# Patient Record
Sex: MALE | Race: WHITE | NOT HISPANIC OR LATINO | Employment: FULL TIME | URBAN - METROPOLITAN AREA
[De-identification: names, ages, dates, MRNs, and addresses within clinical notes are randomized per-mention and may not be internally consistent; named-entity substitution may affect disease eponyms.]

---

## 2017-01-21 ENCOUNTER — OFFICE VISIT (OUTPATIENT)
Dept: INTERNAL MEDICINE | Facility: CLINIC | Age: 27
End: 2017-01-21
Payer: COMMERCIAL

## 2017-01-21 VITALS
BODY MASS INDEX: 26.92 KG/M2 | HEART RATE: 107 BPM | TEMPERATURE: 98 F | DIASTOLIC BLOOD PRESSURE: 80 MMHG | OXYGEN SATURATION: 99 % | SYSTOLIC BLOOD PRESSURE: 138 MMHG | WEIGHT: 171.5 LBS | HEIGHT: 67 IN

## 2017-01-21 DIAGNOSIS — L02.91 ABSCESS: Primary | ICD-10-CM

## 2017-01-21 PROCEDURE — 99999 PR PBB SHADOW E&M-EST. PATIENT-LVL III: CPT | Mod: PBBFAC,,, | Performed by: INTERNAL MEDICINE

## 2017-01-21 PROCEDURE — 99213 OFFICE O/P EST LOW 20 MIN: CPT | Mod: S$GLB,,, | Performed by: INTERNAL MEDICINE

## 2017-01-21 PROCEDURE — 1159F MED LIST DOCD IN RCRD: CPT | Mod: S$GLB,,, | Performed by: INTERNAL MEDICINE

## 2017-01-21 RX ORDER — SULFAMETHOXAZOLE AND TRIMETHOPRIM 800; 160 MG/1; MG/1
1 TABLET ORAL 2 TIMES DAILY
Qty: 14 TABLET | Refills: 0 | Status: SHIPPED | OUTPATIENT
Start: 2017-01-21 | End: 2017-01-28

## 2017-01-21 NOTE — MR AVS SNAPSHOT
[facility]  [address]  [facility] [address]  Phone: [phone]  Fax: [phone]                  Charles Roberto   2017 8:00 AM   Office Visit    Description:  Male : 1990   Provider:  Cora Sanchez MD   Department:  [facility]           Reason for Visit     Knots w/tenderness to touch on chest above nipple area x2-3            Diagnoses this Visit        Comments    Abscess    -  Primary            To Do List           Goals (5 Years of Data)     None       These Medications        Disp Refills Start End    sulfamethoxazole-trimethoprim 800-160mg (BACTRIM DS) 800-160 mg Tab 14 tablet 0 2017    Take 1 tablet by mouth 2 (two) times daily. - Oral    Pharmacy: [facility] [address] [facility] [email or web address]  Ph #: [phone]         [facility] On Call     [facility] On Call Nurse Care Line -  Assistance  Registered nurses in the [facility] provide clinical advisement, health education, appointment booking, and other advisory services.  Call for this free service at [phone].             Medications           Message regarding Medications     Verify the changes and/or additions to your medication regime listed below are the same as discussed with your clinician today.  If any of these changes or additions are incorrect, please notify your healthcare provider.        START taking these NEW medications        Refills    sulfamethoxazole-trimethoprim 800-160mg (BACTRIM DS) 800-160 mg Tab 0    Sig: Take 1 tablet by mouth 2 (two) times daily.    Class: Normal    Route: Oral           Verify that the below list of medications is an accurate representation of the medications you are currently taking.  If none reported, the list may be blank. If incorrect, please contact your healthcare provider. Carry this list with you in case of emergency.           Current Medications      "sulfamethoxazole-trimethoprim 800-160mg (BACTRIM DS) 800-160 mg Tab Take 1 tablet by mouth 2 (two) times daily.           Clinical Reference Information           Vital Signs - Last Recorded  Most recent update: 1/21/2017  8:17 AM by Gabrielle Hurt MA    BP Pulse Temp Ht Wt SpO2    138/80 (BP Location: Left arm, Patient Position: Sitting, BP Method: Manual) 107 98.1 °F (36.7 °C) (Oral) 5' 7" (1.702 m) 77.8 kg (171 lb 8.3 oz) 99%    BMI                26.86 kg/m2          Blood Pressure          Most Recent Value    BP  138/80      Allergies as of 1/21/2017     No Known Allergies      Immunizations Administered on Date of Encounter - 1/21/2017     None      "

## 2017-01-23 ENCOUNTER — PATIENT MESSAGE (OUTPATIENT)
Dept: INTERNAL MEDICINE | Facility: CLINIC | Age: 27
End: 2017-01-23

## 2017-01-29 ENCOUNTER — PATIENT MESSAGE (OUTPATIENT)
Dept: INTERNAL MEDICINE | Facility: CLINIC | Age: 27
End: 2017-01-29

## 2017-02-08 ENCOUNTER — TELEPHONE (OUTPATIENT)
Dept: INTERNAL MEDICINE | Facility: CLINIC | Age: 27
End: 2017-02-08

## 2017-02-08 NOTE — TELEPHONE ENCOUNTER
----- Message from Ary Rahman sent at 2/7/2017  5:50 PM CST -----  Contact: pt  Pt would like a call back regarding scheduling an appt soon then 3/23/17 due to abscessed coming back. Pt would like for nurse to call him back in regards to scheduling an appt.    Pt can be reached at [phone].

## 2017-02-09 ENCOUNTER — OFFICE VISIT (OUTPATIENT)
Dept: INTERNAL MEDICINE | Facility: CLINIC | Age: 27
End: 2017-02-09
Payer: COMMERCIAL

## 2017-02-09 VITALS
WEIGHT: 171.94 LBS | BODY MASS INDEX: 26.06 KG/M2 | SYSTOLIC BLOOD PRESSURE: 125 MMHG | DIASTOLIC BLOOD PRESSURE: 80 MMHG | HEIGHT: 68 IN

## 2017-02-09 DIAGNOSIS — L02.91 ABSCESS: ICD-10-CM

## 2017-02-09 DIAGNOSIS — I88.9 AXILLARY ADENITIS: Primary | ICD-10-CM

## 2017-02-09 PROCEDURE — 99214 OFFICE O/P EST MOD 30 MIN: CPT | Mod: S$GLB,,, | Performed by: INTERNAL MEDICINE

## 2017-02-09 PROCEDURE — 99999 PR PBB SHADOW E&M-EST. PATIENT-LVL II: CPT | Mod: PBBFAC,,, | Performed by: INTERNAL MEDICINE

## 2017-02-09 NOTE — PROGRESS NOTES
Subjective:       Patient ID: Charles Roberto is a 27 y.o. male.    Chief Complaint: Follow-up    HPI Comments:  appt- Dr Thompson patient    Apparently had LN swelling under both armpits, also associated with abscesses on chest?  Treated with antibiotics (Bactrim) 1/21.  Sx got better over the course of a week,  then got worse again after about 5 days.    No fever, chills or sweats.   No significant past medical history.  No diabetes.      Lives with roommate.   No staph that he knows of.  No pets.    Traveled last September to [place], no significant illness other than some GI issues.  This resolved- no sx since October.    Recent physical in December and labs and STD screening all OK.  No concerns about STDs at this time.    No significant PMH              Review of Systems   Constitutional: Negative for chills, fatigue and fever.   HENT: Positive for postnasal drip. Negative for congestion and ear pain.    Eyes: Negative.    Respiratory: Negative for cough, chest tightness, shortness of breath and wheezing.    Cardiovascular: Negative.    Gastrointestinal: Negative.    Genitourinary: Negative for discharge, flank pain, frequency and hematuria.   Musculoskeletal: Negative for arthralgias and back pain.   Skin: Negative for rash.   Hematological: Does not bruise/bleed easily.        See above- not sure about LN       Objective:      Physical Exam   Constitutional: He is oriented to person, place, and time. He appears well-developed and well-nourished.   HENT:   Head: Normocephalic and atraumatic.   Right Ear: External ear normal.   Left Ear: External ear normal.   Eyes: Conjunctivae and EOM are normal.   Neck: Normal range of motion. Neck supple. No thyromegaly present.   Cardiovascular: Normal rate and regular rhythm.    No murmur heard.  Pulmonary/Chest: Effort normal and breath sounds normal. No respiratory distress. He has no wheezes.   No LN in the neck or clavicle area; no axillary LN   Abdominal: Soft. He  exhibits no distension. There is no tenderness.   Musculoskeletal: He exhibits no edema or tenderness.   Lymphadenopathy:     He has no cervical adenopathy.   Neurological: He is alert and oriented to person, place, and time. No cranial nerve deficit.   Skin: Skin is warm and dry.   No lesions on chest  I do not appreciate any LN- may have tiny cyst L axillary area, freely mobile   Psychiatric: He has a normal mood and affect. His behavior is normal.       Assessment:       1. Axillary adenitis    2. Abscess        Plan:         All lesions resolved    Natural history reviewed    Recommended antibacterial soap, wash clothes in hot water    Alarm symptoms reviewed, he is having none    Offered additional screening/STD testing, this was done 6 weeks ago and was acceptable, he currently declines    Follow-up with PCP      Patient counseled for over 50% of his 25 minute appt, all questions answered,  chart reviewed and care coordinated.

## 2017-04-28 ENCOUNTER — LAB VISIT (OUTPATIENT)
Dept: LAB | Facility: HOSPITAL | Age: 27
End: 2017-04-28
Attending: INTERNAL MEDICINE
Payer: COMMERCIAL

## 2017-04-28 ENCOUNTER — PATIENT MESSAGE (OUTPATIENT)
Dept: INTERNAL MEDICINE | Facility: CLINIC | Age: 27
End: 2017-04-28

## 2017-04-28 ENCOUNTER — OFFICE VISIT (OUTPATIENT)
Dept: INTERNAL MEDICINE | Facility: CLINIC | Age: 27
End: 2017-04-28
Payer: COMMERCIAL

## 2017-04-28 VITALS
HEART RATE: 78 BPM | DIASTOLIC BLOOD PRESSURE: 81 MMHG | BODY MASS INDEX: 26.03 KG/M2 | WEIGHT: 171.75 LBS | SYSTOLIC BLOOD PRESSURE: 132 MMHG | HEIGHT: 68 IN

## 2017-04-28 DIAGNOSIS — R22.9 SKIN NODULE: ICD-10-CM

## 2017-04-28 DIAGNOSIS — K92.1 BLOOD IN STOOL: ICD-10-CM

## 2017-04-28 DIAGNOSIS — R19.5 CHANGE IN STOOL: Primary | ICD-10-CM

## 2017-04-28 DIAGNOSIS — R19.5 CHANGE IN STOOL: ICD-10-CM

## 2017-04-28 LAB
ALBUMIN SERPL BCP-MCNC: 4.4 G/DL
ALP SERPL-CCNC: 74 U/L
ALT SERPL W/O P-5'-P-CCNC: 16 U/L
ANION GAP SERPL CALC-SCNC: 7 MMOL/L
AST SERPL-CCNC: 18 U/L
BASOPHILS # BLD AUTO: 0.02 K/UL
BASOPHILS NFR BLD: 0.3 %
BILIRUB SERPL-MCNC: 1.3 MG/DL
BUN SERPL-MCNC: 12 MG/DL
CALCIUM SERPL-MCNC: 10 MG/DL
CHLORIDE SERPL-SCNC: 103 MMOL/L
CO2 SERPL-SCNC: 29 MMOL/L
CREAT SERPL-MCNC: 1.1 MG/DL
DIFFERENTIAL METHOD: ABNORMAL
EOSINOPHIL # BLD AUTO: 0.4 K/UL
EOSINOPHIL NFR BLD: 5.3 %
ERYTHROCYTE [DISTWIDTH] IN BLOOD BY AUTOMATED COUNT: 12.8 %
EST. GFR  (AFRICAN AMERICAN): >60 ML/MIN/1.73 M^2
EST. GFR  (NON AFRICAN AMERICAN): >60 ML/MIN/1.73 M^2
GLUCOSE SERPL-MCNC: 88 MG/DL
HCT VFR BLD AUTO: 47.4 %
HGB BLD-MCNC: 16.8 G/DL
LIPASE SERPL-CCNC: 17 U/L
LYMPHOCYTES # BLD AUTO: 1.9 K/UL
LYMPHOCYTES NFR BLD: 24.4 %
MCH RBC QN AUTO: 32.4 PG
MCHC RBC AUTO-ENTMCNC: 35.4 %
MCV RBC AUTO: 91 FL
MONOCYTES # BLD AUTO: 0.6 K/UL
MONOCYTES NFR BLD: 8.1 %
NEUTROPHILS # BLD AUTO: 4.8 K/UL
NEUTROPHILS NFR BLD: 61.8 %
PLATELET # BLD AUTO: 263 K/UL
PMV BLD AUTO: 11.2 FL
POTASSIUM SERPL-SCNC: 4.9 MMOL/L
PROT SERPL-MCNC: 7.7 G/DL
RBC # BLD AUTO: 5.19 M/UL
SODIUM SERPL-SCNC: 139 MMOL/L
WBC # BLD AUTO: 7.74 K/UL

## 2017-04-28 PROCEDURE — 1160F RVW MEDS BY RX/DR IN RCRD: CPT | Mod: S$GLB,,, | Performed by: INTERNAL MEDICINE

## 2017-04-28 PROCEDURE — 36415 COLL VENOUS BLD VENIPUNCTURE: CPT

## 2017-04-28 PROCEDURE — 99214 OFFICE O/P EST MOD 30 MIN: CPT | Mod: S$GLB,,, | Performed by: INTERNAL MEDICINE

## 2017-04-28 PROCEDURE — 85025 COMPLETE CBC W/AUTO DIFF WBC: CPT

## 2017-04-28 PROCEDURE — 83690 ASSAY OF LIPASE: CPT

## 2017-04-28 PROCEDURE — 80053 COMPREHEN METABOLIC PANEL: CPT

## 2017-04-28 PROCEDURE — 99999 PR PBB SHADOW E&M-EST. PATIENT-LVL III: CPT | Mod: PBBFAC,,, | Performed by: INTERNAL MEDICINE

## 2017-04-28 NOTE — MR AVS SNAPSHOT
"    [facility]  [address]  [place] [address]  Phone: [phone]  Fax: [phone]                  Charles Roberto   2017 8:40 AM   Office Visit    Description:  Male : 1990   Provider:  Samuel Thompson MD   Department:  [facility] Medicine           Reason for Visit     Follow-up           Diagnoses this Visit        Comments    Change in stool    -  Primary     Blood in stool         Skin nodule                To Do List           Goals (5 Years of Data)     None      Follow-Up and Disposition     Return in about 1 year (around 2018) for EPP annual exam.      [facility]     [facility] On Call Nurse Care Line -  Assistance  Unless otherwise directed by your provider, please contact [facility], our nurse care line that is available for  assistance.     Registered nurses in the [facility] provide: appointment scheduling, clinical advisement, health education, and other advisory services.  Call: [phone] (toll free)               Medications           Message regarding Medications     Verify the changes and/or additions to your medication regime listed below are the same as discussed with your clinician today.  If any of these changes or additions are incorrect, please notify your healthcare provider.             Verify that the below list of medications is an accurate representation of the medications you are currently taking.  If none reported, the list may be blank. If incorrect, please contact your healthcare provider. Carry this list with you in case of emergency.                Clinical Reference Information           Your Vitals Were     BP Pulse Height Weight BMI    132/81 (BP Location: Right arm, Patient Position: Sitting, BP Method: Manual) 78 5' 8" (1.727 m) 77.9 kg (171 lb 11.8 oz) 26.11 kg/m2      Blood Pressure          Most Recent Value    BP  132/81      Allergies as of 2017     No Known Allergies    "   Immunizations Administered on Date of Encounter - 4/28/2017     None      Orders Placed During Today's Visit      Normal Orders This Visit    Ambulatory Referral to Dermatology     Future Labs/Procedures Expected by Expires    CBC auto differential  4/28/2017 (Approximate) 6/27/2018    Comprehensive metabolic panel  4/28/2017 4/28/2018    Lipase  4/28/2017 7/27/2017    Occult Blood Stool, CA Screen  4/28/2017 4/28/2018    Occult Blood Stool, CA Screen  4/28/2017 4/28/2018    Occult Blood Stool, CA Screen  4/28/2017 4/28/2018    Stool Exam-Ova,Cysts,Parasites  4/28/2017 4/28/2018      Instructions    Please try an over-the-counter probiotic once daily. Culturelle is often recommended. Take for 2 weeks. If symptoms recur after stopping, restart for 4 weeks and see if this is sufficient.    If this doesn't lead to any noticeable change, try over-the-counter Pepcid or Zantac two times a day for 2 weeks.    If neither of these lead to any improvement, please notify the office for a referral to Gastroenterology.        Language Assistance Services     ATTENTION: Language assistance services are available, free of charge. Please call [phone].      ATENCIÓN: Si habla español, tiene a bosch disposición servicios gratuitos de asistencia lingüística. Llame al [phone].     LM Ý: N?u b?n nói Ti?ng Vi?t, có các d?ch v? h? tr? ngôn ng? mi?n phí dành cho b?n. G?i s? [phone].         [facility] complies with applicable Federal civil rights laws and does not discriminate on the basis of race, color, national origin, age, disability, or sex.

## 2017-04-28 NOTE — PROGRESS NOTES
"Subjective:       Patient ID: Charles Roberto is a 27 y.o. male.    Chief Complaint: Follow-up    HPI    Last visit with me 12/2015.    Yellow stool last few months. Seems to have more mucus, still formed. Sometimes black specks. No abdominal pain. No nocturnal bowel movement, no increased frequency. Never in past. Last fall went on trip to [place], had bad stomach problems but resolved. These new symptoms maybe started afterwards. No specific food triggers. No nausea. No weight change. Some meals feels more bloated than usual, not regularly. Hasn't tried any meds. No GERD. No FH of IBD.    Skin nodules not changes with antibiotics. Continues to occur. Show up close to nipples or armpits. Some redness, no pus. Some mild tenderness but after 1 week will go away. No systemic symptoms when this is going on. No fever.     Review of Systems    As per HPI    Objective:      Physical Exam   Constitutional: He is oriented to person, place, and time. No distress.    man whose Body mass index is 26.11 kg/(m^2).    Abdominal: Soft. Bowel sounds are normal. He exhibits no distension and no mass. There is no hepatosplenomegaly. There is no tenderness. There is no guarding.   Neurological: He is alert and oriented to person, place, and time.   Skin: Skin is warm and dry.   Mild localized area of erythema approx 1 cm in diameter in L upper chest near nipple without underlying nodule or skin ulceration.   Psychiatric: He has a normal mood and affect. His behavior is normal.   Nursing note and vitals reviewed.      Vitals:    04/28/17 0841   BP: 132/81   BP Location: Right arm   Patient Position: Sitting   BP Method: Manual   Pulse: 78   Weight: 77.9 kg (171 lb 11.8 oz)   Height: 5' 8" (1.727 m)     Body mass index is 26.11 kg/(m^2).    RESULTS: Reviewed labs from last 6 months    Assessment:       1. Change in stool    2. Blood in stool    3. Skin nodule        Plan:   Charles was seen today for follow-up.    Diagnoses and all " "orders for this visit:    Change in stool:  Reports some mucus, assess as below. Given absence of pain and weight loss seems less likely to be IBD, possible IBS. Also try over-the-counter meds to see if there is improvement; if not will refer to Gastroenterology:  "Please try an over-the-counter probiotic once daily. Culturelle is often recommended. Take for 2 weeks. If symptoms recur after stopping, restart for 4 weeks and see if this is sufficient.  If this doesn't lead to any noticeable change, try over-the-counter Pepcid or Zantac two times a day for 2 weeks.  If neither of these lead to any improvement, please notify the office for a referral to Gastroenterology."  -     Stool Exam-Ova,Cysts,Parasites; Future  -     Comprehensive metabolic panel; Future  -     CBC auto differential; Future  -     Lipase; Future    Blood in stool:  Only 1-2 times, will check for occult blood.  -     Cancel: Occult blood x 1, stool; Future  -     Occult Blood Stool, CA Screen; Future  -     Occult Blood Stool, CA Screen; Future  -     Occult Blood Stool, CA Screen; Future    Skin nodule:  In armpits and around nipples, unclear etiology, eval with Dermatology.  -     Ambulatory Referral to Dermatology    Return in about 1 year (around 4/28/2018) for EPP annual exam. Lab today.  Samuel Thompson MD  Internal Medicine    Portions of this note were completed using [employer] dictation software. Please excuse typographical or syntax errors.   "

## 2017-04-28 NOTE — PATIENT INSTRUCTIONS
Please try an over-the-counter probiotic once daily. Culturelle is often recommended. Take for 2 weeks. If symptoms recur after stopping, restart for 4 weeks and see if this is sufficient.    If this doesn't lead to any noticeable change, try over-the-counter Pepcid or Zantac two times a day for 2 weeks.    If neither of these lead to any improvement, please notify the office for a referral to Gastroenterology.

## 2017-05-08 ENCOUNTER — LAB VISIT (OUTPATIENT)
Dept: LAB | Facility: HOSPITAL | Age: 27
End: 2017-05-08
Attending: INTERNAL MEDICINE
Payer: COMMERCIAL

## 2017-05-08 DIAGNOSIS — R19.5 CHANGE IN STOOL: ICD-10-CM

## 2017-05-08 DIAGNOSIS — K92.1 BLOOD IN STOOL: ICD-10-CM

## 2017-05-08 PROCEDURE — 82270 OCCULT BLOOD FECES: CPT

## 2017-05-09 LAB
OB PNL STL: NEGATIVE

## 2017-05-31 ENCOUNTER — PATIENT MESSAGE (OUTPATIENT)
Dept: INTERNAL MEDICINE | Facility: CLINIC | Age: 27
End: 2017-05-31

## 2017-05-31 NOTE — TELEPHONE ENCOUNTER
"Please call lab. Pt had delivered stool sample for testing a few weeks ago; test for ova and parasites still reports "In Process". Please see what is going on with this test.  "

## 2017-06-02 ENCOUNTER — TELEPHONE (OUTPATIENT)
Dept: DERMATOLOGY | Facility: CLINIC | Age: 27
End: 2017-06-02

## 2017-06-02 NOTE — TELEPHONE ENCOUNTER
Spoke with pt, informed him that we can cancel his appointment on 6/6 and when the spot comes back up, he can send us a message and we will try to get him in within the next couple of days.     ----- Message from Clifton Linton sent at 6/2/2017  4:11 PM CDT -----  Contact: Patient  Pt has appt to be seen for an abscess that comes and goes but is requesting to speak to staff as he does not have any currently. Pt does not want to waste provider's time by having nothing to show but would like to speak with her to have care in place once another one comes. Please call pt at  [phone]

## 2017-06-02 NOTE — TELEPHONE ENCOUNTER
Spoke to [place] today he looked it up he said it never got done  Couldn't explain anything else to me asked him if the kit was there he said no and he doesn't know what happened but that it may need to be recollected

## 2017-06-12 ENCOUNTER — PATIENT MESSAGE (OUTPATIENT)
Dept: INTERNAL MEDICINE | Facility: CLINIC | Age: 27
End: 2017-06-12

## 2017-06-12 DIAGNOSIS — R19.7 DIARRHEA, UNSPECIFIED TYPE: Primary | ICD-10-CM

## 2017-06-13 NOTE — TELEPHONE ENCOUNTER
I have placed a new order for a stool sample. Please call lab to see if there are any special requirements for  or drop off. Once everything is in place, please call the patient to relay instructions.

## 2017-06-19 ENCOUNTER — LAB VISIT (OUTPATIENT)
Dept: LAB | Facility: HOSPITAL | Age: 27
End: 2017-06-19
Attending: INTERNAL MEDICINE
Payer: COMMERCIAL

## 2017-06-19 DIAGNOSIS — R19.7 DIARRHEA, UNSPECIFIED TYPE: ICD-10-CM

## 2017-06-19 PROCEDURE — 87209 SMEAR COMPLEX STAIN: CPT

## 2017-06-20 ENCOUNTER — PATIENT MESSAGE (OUTPATIENT)
Dept: INTERNAL MEDICINE | Facility: CLINIC | Age: 27
End: 2017-06-20

## 2017-06-20 DIAGNOSIS — R19.7 DIARRHEA, UNSPECIFIED TYPE: Primary | ICD-10-CM

## 2017-06-20 LAB — O+P STL TRI STN: NORMAL

## 2017-06-26 ENCOUNTER — PATIENT MESSAGE (OUTPATIENT)
Dept: DERMATOLOGY | Facility: CLINIC | Age: 27
End: 2017-06-26

## 2017-08-24 ENCOUNTER — LAB VISIT (OUTPATIENT)
Dept: LAB | Facility: HOSPITAL | Age: 27
End: 2017-08-24
Payer: COMMERCIAL

## 2017-08-24 ENCOUNTER — OFFICE VISIT (OUTPATIENT)
Dept: GASTROENTEROLOGY | Facility: CLINIC | Age: 27
End: 2017-08-24
Payer: COMMERCIAL

## 2017-08-24 VITALS
HEART RATE: 99 BPM | HEIGHT: 68 IN | SYSTOLIC BLOOD PRESSURE: 130 MMHG | BODY MASS INDEX: 24.46 KG/M2 | WEIGHT: 161.38 LBS | DIASTOLIC BLOOD PRESSURE: 79 MMHG

## 2017-08-24 DIAGNOSIS — R19.5 LOOSE STOOLS: Primary | ICD-10-CM

## 2017-08-24 DIAGNOSIS — R19.5 LOOSE STOOLS: ICD-10-CM

## 2017-08-24 DIAGNOSIS — R15.1 FECAL SMEARING: ICD-10-CM

## 2017-08-24 DIAGNOSIS — R79.89 ELEVATED LFTS: ICD-10-CM

## 2017-08-24 LAB
ALBUMIN SERPL BCP-MCNC: 4.7 G/DL
ALP SERPL-CCNC: 89 U/L
ALT SERPL W/O P-5'-P-CCNC: 20 U/L
AST SERPL-CCNC: 18 U/L
BILIRUB DIRECT SERPL-MCNC: 0.4 MG/DL
BILIRUB SERPL-MCNC: 1.3 MG/DL
PROT SERPL-MCNC: 8.4 G/DL

## 2017-08-24 PROCEDURE — 3008F BODY MASS INDEX DOCD: CPT | Mod: S$GLB,,, | Performed by: PHYSICIAN ASSISTANT

## 2017-08-24 PROCEDURE — 99999 PR PBB SHADOW E&M-EST. PATIENT-LVL III: CPT | Mod: PBBFAC,,, | Performed by: PHYSICIAN ASSISTANT

## 2017-08-24 PROCEDURE — 36415 COLL VENOUS BLD VENIPUNCTURE: CPT

## 2017-08-24 PROCEDURE — 99203 OFFICE O/P NEW LOW 30 MIN: CPT | Mod: S$GLB,,, | Performed by: PHYSICIAN ASSISTANT

## 2017-08-24 PROCEDURE — 80076 HEPATIC FUNCTION PANEL: CPT

## 2017-08-24 NOTE — PATIENT INSTRUCTIONS
Restart the culturelle- take for atleast 6 weeks    Take miralax 17g (1 capful) at night around 6pm in 10oz of water. Take for 1 weeks

## 2017-08-24 NOTE — PROGRESS NOTES
[facility] Consultation Note    Reason for Consult:  The primary encounter diagnosis was Loose stools. Diagnoses of Fecal smearing and Elevated LFTs were also pertinent to this visit.    PCP: Samuel Thompson   [address] / [place] [address]  REF MD: Samuel Thompson MD  [address] [facility] [address] [facility] [address] [place] [address]    HPI:  This is a 27 y.o. male here for evaluation of diarrhea.    Bowel movements a day - 1-2 per day   Consistency - loose  Duration - 8 months  Antibiotics/Travel/Well Water - no  Blood/Mucus/Pus/Oily - stool appear yellow, + mucus,   Nocturnal stools - no   Weight loss - no   Fasting - no change with out without food  Mild periumbilical pain - ocaasional if he eats a large meal  Food poisoning oct 2016 - diarrhea and dark stools, did not take antibiotics, and eventually resolved. He has noticed the above change in bowel habits ever since    Tried taking a probiotic  (Culturelle) for 1 week no change  Also occasional fecal leakage     No Fhx GI cancers or     Intermittent abcesses feb 2017, non recent skin lesions. No joint pains    He was seen by his PCP for this issue and o/p stool studies were normal.     ROS:  Constitutional: No fevers, chills, No weight loss  ENT: No allergies  CV: No chest pain  Pulm: No cough, No shortness of breath  Ophtho: No vision changes  GI: see HPI  Derm: No rash  Heme: No lymphadenopathy, No bruising  MSK: No arthritis  : No dysuria, No hematuria  Endo: No hot or cold intolerance  Neuro: No syncope, No seizure  Psych: No anxiety, No depression    Medical History:  has no past medical history on file.    Surgical History:  has no past surgical history on file.    Family History: family history includes Diabetes in his paternal aunt; Gout in his father; No Known Problems in his mother..     Social History:  reports that he has never smoked. He has never used smokeless tobacco. He reports that he drinks alcohol. He reports that he does not use  "drugs.    Review of patient's allergies indicates:  No Known Allergies    No current outpatient prescriptions on file.     No current facility-administered medications for this visit.          Objective Findings:    Vital Signs:  /79   Pulse 99   Ht 5' 8" (1.727 m)   Wt 73.2 kg (161 lb 6 oz)   BMI 24.54 kg/m²   Body mass index is 24.54 kg/m².    Physical Exam:  General Appearance: Well appearing in no acute distress  Head:   Normocephalic, without obvious abnormality  Eyes:    No scleral icterus  ENT: Neck supple, Lips, mucosa, and tongue normal; teeth and gums normal  Lungs: CTA bilaterally in anterior and posterior fields, no wheezes, no crackles.  Heart:  Regular rate and rhythm, S1, S2 normal, no murmurs heard  Abdomen: Soft, non tender, non distended with positive bowel sounds in all four quadrants.  Extremities: no edema  Skin: No rash  Neurologic: AAO x 3     Labs:  Lab Results   Component Value Date    WBC 7.74 04/28/2017    HGB 16.8 04/28/2017    HCT 47.4 04/28/2017     04/28/2017    CHOL 182 12/17/2016    TRIG 106 12/17/2016    HDL 46 12/17/2016    ALT 20 08/24/2017    AST 18 08/24/2017     04/28/2017    K 4.9 04/28/2017     04/28/2017    CREATININE 1.1 04/28/2017    BUN 12 04/28/2017    CO2 29 04/28/2017         Endoscopy:      Assessment:  1. Loose stools    2. Fecal smearing    3. Elevated LFTs       28yo M with loose yellow mucus burdened stools x 8 months after an episode of food poisoning. Occasional fecal smearing    Recommendations:  1. Stool studies to rule out infection and inflammation and malabsorption    2. Labs to rule out inflammation     3. Re-start culturelle    Take miralax 17g (1 capful) at night around 6pm in 10oz of water. Take for 1 week to see if fecal smearing improves    No Follow-up on file.      Order summary:  Orders Placed This Encounter    Stool culture    WBC, Stool    C-reactive protein    Giardia / Cryptosporidum, EIA    Giardia / " Cryptosporidum, EIA    Giardia / Cryptosporidum, EIA    Fecal fat, qualitative    Hepatic function panel         Thank you so much for allowing me to participate in the care of Charles Ambriz MD

## 2017-08-24 NOTE — LETTER
August 24, 2017      Samuel Thompson MD  [address]  [place] [address]           [facility] - Gastroenterology  [address]  [place] [address]  Phone: [phone]  Fax: [phone]          Patient: Charles Roberto   MR Number: [id number]   YOB: 1990   Date of Visit: 8/24/2017       Dear Dr. Samuel Thompson:    Thank you for referring Charles Roberto to me for evaluation. Attached you will find relevant portions of my assessment and plan of care.    If you have questions, please do not hesitate to call me. I look forward to following Charles Roberto along with you.    Sincerely,    Adiel Ambriz PA-C    Enclosure  CC:  No Recipients    If you would like to receive this communication electronically, please contact [email or web address] or [phone] to request more information on [employer] access.    For providers and/or their staff who would like to refer a patient to [facility], please contact us through our one-stop-shop provider referral line, [facility] , at [phone].    If you feel you have received this communication in error or would no longer like to receive these types of communications, please e-mail [email or web address]

## 2017-08-27 ENCOUNTER — PATIENT MESSAGE (OUTPATIENT)
Dept: GASTROENTEROLOGY | Facility: CLINIC | Age: 27
End: 2017-08-27

## 2017-08-27 DIAGNOSIS — R17 ELEVATED BILIRUBIN: Primary | ICD-10-CM

## 2017-08-28 ENCOUNTER — TELEPHONE (OUTPATIENT)
Dept: GASTROENTEROLOGY | Facility: CLINIC | Age: 27
End: 2017-08-28

## 2017-08-28 NOTE — TELEPHONE ENCOUNTER
Attempted to contact patient in regards to scheduling abd US with no success.     Left Message for patient to call the clinic.

## 2017-08-30 ENCOUNTER — PATIENT MESSAGE (OUTPATIENT)
Dept: GASTROENTEROLOGY | Facility: CLINIC | Age: 27
End: 2017-08-30

## 2017-08-30 ENCOUNTER — LAB VISIT (OUTPATIENT)
Dept: LAB | Facility: HOSPITAL | Age: 27
End: 2017-08-30
Attending: PHYSICIAN ASSISTANT
Payer: COMMERCIAL

## 2017-08-30 DIAGNOSIS — R19.5 LOOSE STOOLS: ICD-10-CM

## 2017-08-30 PROCEDURE — 87046 STOOL CULTR AEROBIC BACT EA: CPT

## 2017-08-30 PROCEDURE — 87427 SHIGA-LIKE TOXIN AG IA: CPT | Mod: 59

## 2017-08-30 PROCEDURE — 89125 SPECIMEN FAT STAIN: CPT

## 2017-08-30 PROCEDURE — 87329 GIARDIA AG IA: CPT

## 2017-08-30 PROCEDURE — 87045 FECES CULTURE AEROBIC BACT: CPT

## 2017-08-30 PROCEDURE — 89055 LEUKOCYTE ASSESSMENT FECAL: CPT

## 2017-08-31 ENCOUNTER — HOSPITAL ENCOUNTER (OUTPATIENT)
Dept: RADIOLOGY | Facility: HOSPITAL | Age: 27
Discharge: HOME | End: 2017-08-31
Attending: PHYSICIAN ASSISTANT
Payer: COMMERCIAL

## 2017-08-31 DIAGNOSIS — R17 ELEVATED BILIRUBIN: ICD-10-CM

## 2017-08-31 LAB
E COLI SXT1 STL QL IA: NEGATIVE
E COLI SXT2 STL QL IA: NEGATIVE
FAT STL SUDAN IV STN: NORMAL
WBC #/AREA STL HPF: NORMAL /[HPF]

## 2017-08-31 PROCEDURE — 76700 US EXAM ABDOM COMPLETE: CPT | Mod: TC

## 2017-08-31 PROCEDURE — 76700 US EXAM ABDOM COMPLETE: CPT | Mod: 26,,, | Performed by: RADIOLOGY

## 2017-09-01 LAB
CRYPTOSP AG STL QL IA: NEGATIVE
G LAMBLIA AG STL QL IA: NEGATIVE

## 2017-09-04 ENCOUNTER — PATIENT MESSAGE (OUTPATIENT)
Dept: GASTROENTEROLOGY | Facility: CLINIC | Age: 27
End: 2017-09-04

## 2017-09-05 ENCOUNTER — PATIENT MESSAGE (OUTPATIENT)
Dept: GASTROENTEROLOGY | Facility: CLINIC | Age: 27
End: 2017-09-05

## 2017-09-05 LAB — BACTERIA STL CULT: NORMAL

## 2018-12-27 ENCOUNTER — OFFICE VISIT (OUTPATIENT)
Dept: INTERNAL MEDICINE | Facility: CLINIC | Age: 28
End: 2018-12-27
Payer: COMMERCIAL

## 2018-12-27 ENCOUNTER — LAB VISIT (OUTPATIENT)
Dept: LAB | Facility: HOSPITAL | Age: 28
End: 2018-12-27
Attending: PHYSICIAN ASSISTANT
Payer: COMMERCIAL

## 2018-12-27 VITALS
OXYGEN SATURATION: 98 % | HEART RATE: 87 BPM | DIASTOLIC BLOOD PRESSURE: 74 MMHG | WEIGHT: 166.25 LBS | HEIGHT: 68 IN | BODY MASS INDEX: 25.2 KG/M2 | SYSTOLIC BLOOD PRESSURE: 126 MMHG

## 2018-12-27 DIAGNOSIS — Z00.00 ANNUAL PHYSICAL EXAM: Primary | ICD-10-CM

## 2018-12-27 DIAGNOSIS — Z00.00 ANNUAL PHYSICAL EXAM: ICD-10-CM

## 2018-12-27 LAB
ALBUMIN SERPL BCP-MCNC: 4.3 G/DL
ALP SERPL-CCNC: 61 U/L
ALT SERPL W/O P-5'-P-CCNC: 84 U/L
ANION GAP SERPL CALC-SCNC: 10 MMOL/L
AST SERPL-CCNC: 133 U/L
BASOPHILS # BLD AUTO: 0.03 K/UL
BASOPHILS NFR BLD: 0.3 %
BILIRUB SERPL-MCNC: 1.1 MG/DL
BUN SERPL-MCNC: 12 MG/DL
CALCIUM SERPL-MCNC: 9.6 MG/DL
CHLORIDE SERPL-SCNC: 103 MMOL/L
CO2 SERPL-SCNC: 28 MMOL/L
CREAT SERPL-MCNC: 1.1 MG/DL
DIFFERENTIAL METHOD: ABNORMAL
EOSINOPHIL # BLD AUTO: 0.6 K/UL
EOSINOPHIL NFR BLD: 6.5 %
ERYTHROCYTE [DISTWIDTH] IN BLOOD BY AUTOMATED COUNT: 12.7 %
EST. GFR  (AFRICAN AMERICAN): >60 ML/MIN/1.73 M^2
EST. GFR  (NON AFRICAN AMERICAN): >60 ML/MIN/1.73 M^2
GLUCOSE SERPL-MCNC: 84 MG/DL
HCT VFR BLD AUTO: 45.9 %
HGB BLD-MCNC: 15.8 G/DL
LYMPHOCYTES # BLD AUTO: 2.4 K/UL
LYMPHOCYTES NFR BLD: 27.7 %
MCH RBC QN AUTO: 31.8 PG
MCHC RBC AUTO-ENTMCNC: 34.4 G/DL
MCV RBC AUTO: 92 FL
MONOCYTES # BLD AUTO: 0.7 K/UL
MONOCYTES NFR BLD: 8.4 %
NEUTROPHILS # BLD AUTO: 4.9 K/UL
NEUTROPHILS NFR BLD: 56.9 %
PLATELET # BLD AUTO: 272 K/UL
PMV BLD AUTO: 10.6 FL
POTASSIUM SERPL-SCNC: 4.3 MMOL/L
PROT SERPL-MCNC: 7.2 G/DL
RBC # BLD AUTO: 4.97 M/UL
SODIUM SERPL-SCNC: 141 MMOL/L
WBC # BLD AUTO: 8.65 K/UL

## 2018-12-27 PROCEDURE — 80053 COMPREHEN METABOLIC PANEL: CPT

## 2018-12-27 PROCEDURE — 99999 PR PBB SHADOW E&M-EST. PATIENT-LVL III: CPT | Mod: PBBFAC,,, | Performed by: PHYSICIAN ASSISTANT

## 2018-12-27 PROCEDURE — 36415 COLL VENOUS BLD VENIPUNCTURE: CPT

## 2018-12-27 PROCEDURE — 99395 PREV VISIT EST AGE 18-39: CPT | Mod: S$GLB,,, | Performed by: PHYSICIAN ASSISTANT

## 2018-12-27 PROCEDURE — 85025 COMPLETE CBC W/AUTO DIFF WBC: CPT

## 2018-12-27 NOTE — PROGRESS NOTES
"Subjective:       Patient ID: Charles Roberto is a 28 y.o. male with no significant PMH.     Chief Complaint: Annual Exam    HPI     Established pt of Samuel Thompson MD    Presents today for annual exam. No acute complaints.     Last seen by Dr. Madera in 4/2017 with c/o change is stool since then he reports he had endoscopy and colonoscopy in [place] this year that was normal.     Review of patient's allergies indicates:  No Known Allergies    Social History     Tobacco Use    Smoking status: Never Smoker    Smokeless tobacco: Never Used   Substance Use Topics    Alcohol use: Yes     Comment: Mostly on weekends    Drug use: No     Patient Active Problem List   Diagnosis    Loose stools         History reviewed. No pertinent past medical history.      Review of Systems   Constitutional: Negative for chills, fever and unexpected weight change.   Eyes: Negative for visual disturbance.   Respiratory: Negative for cough, shortness of breath and wheezing.    Cardiovascular: Negative for chest pain and leg swelling.   Gastrointestinal: Negative for abdominal pain, nausea and vomiting.   Endocrine: Negative for polydipsia, polyphagia and polyuria.   Skin: Negative for rash.   Neurological: Negative for weakness, light-headedness and headaches.       Objective: /74   Pulse 87   Ht 5' 8" (1.727 m)   Wt 75.4 kg (166 lb 3.6 oz)   SpO2 98%   BMI 25.27 kg/m²         Physical Exam   Constitutional: He is oriented to person, place, and time. He appears well-developed and well-nourished. No distress.   HENT:   Head: Normocephalic and atraumatic.   Mouth/Throat: Oropharynx is clear and moist.   Eyes: Pupils are equal, round, and reactive to light.   Cardiovascular: Normal rate and regular rhythm. Exam reveals no friction rub.   No murmur heard.  Pulmonary/Chest: Effort normal and breath sounds normal. He has no wheezes. He has no rales.   Abdominal: Soft. Bowel sounds are normal. There is no tenderness.   Musculoskeletal: " Normal range of motion.   Neurological: He is alert and oriented to person, place, and time.   Skin: Skin is warm and dry. Capillary refill takes less than 2 seconds. No rash noted.   Psychiatric: He has a normal mood and affect.   Vitals reviewed.      Assessment:       1. Annual physical exam        Plan:         Charles was seen today for annual exam.    Diagnoses and all orders for this visit:    Annual physical exam  -     CBC auto differential; Future  -     Comprehensive metabolic panel; Future      Christi Linton PA-C

## 2018-12-28 ENCOUNTER — TELEPHONE (OUTPATIENT)
Dept: INTERNAL MEDICINE | Facility: CLINIC | Age: 28
End: 2018-12-28

## 2018-12-28 DIAGNOSIS — R74.8 ELEVATED LIVER ENZYMES: Primary | ICD-10-CM

## 2018-12-31 ENCOUNTER — HOSPITAL ENCOUNTER (OUTPATIENT)
Dept: RADIOLOGY | Facility: HOSPITAL | Age: 28
Discharge: HOME | End: 2018-12-31
Attending: PHYSICIAN ASSISTANT
Payer: COMMERCIAL

## 2018-12-31 DIAGNOSIS — R74.8 ELEVATED LIVER ENZYMES: ICD-10-CM

## 2018-12-31 PROCEDURE — 76705 ECHO EXAM OF ABDOMEN: CPT | Mod: 26,,, | Performed by: INTERNAL MEDICINE

## 2018-12-31 PROCEDURE — 76705 ECHO EXAM OF ABDOMEN: CPT | Mod: TC

## 2018-12-31 PROCEDURE — 76705 US ABDOMEN LIMITED: ICD-10-PCS | Mod: 26,,, | Performed by: INTERNAL MEDICINE

## 2019-01-03 ENCOUNTER — PATIENT MESSAGE (OUTPATIENT)
Dept: INTERNAL MEDICINE | Facility: CLINIC | Age: 29
End: 2019-01-03

## 2019-01-03 DIAGNOSIS — R74.8 ELEVATED LIVER ENZYMES: Primary | ICD-10-CM

## 2019-01-05 ENCOUNTER — PATIENT MESSAGE (OUTPATIENT)
Dept: INTERNAL MEDICINE | Facility: CLINIC | Age: 29
End: 2019-01-05